# Patient Record
Sex: FEMALE | Race: BLACK OR AFRICAN AMERICAN | NOT HISPANIC OR LATINO | Employment: FULL TIME | URBAN - METROPOLITAN AREA
[De-identification: names, ages, dates, MRNs, and addresses within clinical notes are randomized per-mention and may not be internally consistent; named-entity substitution may affect disease eponyms.]

---

## 2019-09-13 ENCOUNTER — OFFICE VISIT (OUTPATIENT)
Dept: NEUROSURGERY | Facility: CLINIC | Age: 64
End: 2019-09-13

## 2019-09-13 VITALS
BODY MASS INDEX: 31.54 KG/M2 | DIASTOLIC BLOOD PRESSURE: 74 MMHG | WEIGHT: 178 LBS | HEART RATE: 83 BPM | SYSTOLIC BLOOD PRESSURE: 127 MMHG | HEIGHT: 63 IN

## 2019-09-13 DIAGNOSIS — M51.26 LUMBAR DISC HERNIATION: Primary | ICD-10-CM

## 2019-09-13 DIAGNOSIS — M53.2X6 SPINAL INSTABILITY, LUMBAR: ICD-10-CM

## 2019-09-13 DIAGNOSIS — M43.16 SPONDYLOLISTHESIS, LUMBAR REGION: ICD-10-CM

## 2019-09-13 PROCEDURE — 99204 OFFICE O/P NEW MOD 45 MIN: CPT | Performed by: NEUROLOGICAL SURGERY

## 2019-09-13 RX ORDER — GABAPENTIN 100 MG/1
CAPSULE ORAL
COMMUNITY
Start: 2019-08-19

## 2019-09-13 RX ORDER — MELOXICAM 15 MG/1
15 TABLET ORAL DAILY
COMMUNITY
Start: 2019-06-03

## 2019-09-13 RX ORDER — LISINOPRIL 10 MG/1
TABLET ORAL
COMMUNITY
Start: 2019-07-09

## 2019-09-13 RX ORDER — FLUTICASONE PROPIONATE 50 MCG
SPRAY, SUSPENSION (ML) NASAL
COMMUNITY
Start: 2019-08-18

## 2019-09-13 RX ORDER — MELATONIN
1000 DAILY
COMMUNITY

## 2019-09-13 RX ORDER — PYRAZINAMIDE 500 MG/1
TABLET ORAL
COMMUNITY
Start: 2019-07-23

## 2019-09-13 RX ORDER — CETIRIZINE HYDROCHLORIDE 10 MG/1
10 TABLET ORAL DAILY
COMMUNITY

## 2019-09-13 NOTE — PROGRESS NOTES
Subjective   History of Present Illness: Alyssa Waller is a 64 y.o. female is here today as a self referral for back pain that radiates into bilateral lower extremity's with numbness and tingling in her left foot.  She was worked up and treated by Dr. Serrato, ortho spine, at [facility] this spring and summer.  She presents today with a former patient of mine who she works with at [employer].      She has had this pain since May 2019 which began as back and left leg pain.  She has done 6-7 weeks of physical therapy and 2 HIMA with no relief. She is currently in pain management at [facility]. She is currently taking Gabapentin 100 mg 3 times a day.  She denies loss of motor or sensory function in lower extremities.  Denies bowel or bladder dysfunction.  She became tearful during the discussion because she feels like she has been dealing with it so long without relief.    Back Pain   This is a recurrent problem. The current episode started more than 1 month ago. The problem occurs constantly. The problem has been gradually worsening since onset. The pain is present in the lumbar spine. The quality of the pain is described as aching, burning and stabbing. The pain radiates to the left thigh and left knee. The pain is at a severity of 10/10. The pain is severe. The pain is worse during the day. The symptoms are aggravated by standing and sitting (Ambulating). Associated symptoms include numbness and tingling. Pertinent negatives include no bladder incontinence, bowel incontinence or chest pain. She has tried analgesics, heat, NSAIDs and home exercises (Physical therapy, HIMA,) for the symptoms. The treatment provided mild relief.       The following portions of the patient's history were reviewed and updated as appropriate: allergies, current medications, past family history, past medical history, past social history, past surgical history and problem list.    Review of Systems  "  Constitutional: Positive for activity change.   HENT: Negative.    Eyes: Negative.    Respiratory: Negative.  Negative for chest tightness and shortness of breath.    Cardiovascular: Negative.  Negative for chest pain.   Gastrointestinal: Negative.  Negative for bowel incontinence.   Endocrine: Negative.    Genitourinary: Negative.  Negative for bladder incontinence.   Musculoskeletal: Positive for arthralgias ( Hip pain), back pain, gait problem and myalgias.   Skin: Negative.    Allergic/Immunologic: Negative.    Neurological: Positive for tingling and numbness.   Hematological: Negative.    Psychiatric/Behavioral: Negative.    All other systems reviewed and are negative.      Objective     Vitals:    09/13/19 1404   BP: 127/74   Pulse: 83   Weight: 80.7 kg (178 lb)   Height: 160 cm (63\")     Body mass index is 31.53 kg/m².      Physical Exam  Neurologic Exam     Physical Exam:    CONSTITUTIONAL: This 64 year old right handed -American female appears well developed, well-nourished and in no acute distress.    HEAD & FACE: the head and face are symmetric, normocephalic and atraumatic.    EYES: Inspection of the conjunctivae and lids reveals no swelling, erythema or discharge.  Pupils are round, equal and reactive to light and there is no scleral icterus.    EARS, NOSE, MOUTH & THROAT: On inspection, the ears and nose are within normal limits.    NECK: the neck is supple and symmetric. The trachea is midline with no masses.    PULMONARY: Respiratory effort is normal with no increased work of breathing or signs of respiratory distress.    CARDIOVASCULAR: Pedal pulses are +1/4 bilaterally. Examination of the extremities shows no edema or varicosities.    LYMPHATIC: There is no palpable lymphadenopathy of the neck.    MUSCULOSKELETAL: Gait and station are within normal limits. The spine has normal alignment and decreased range of motion secondary to pain, there is some discomfort to palpation of the left SI " joint.    SKIN: The skin is warm, dry and intact    NEUROLOGIC:   Cranial Nerves 2-12 intact  Normal motor strength noted. Muscle bulk and tone are normal.  Sensory exam is normal to all modalities.  Reflexes on the right side demonstrates 1/4 Triceps Reflex, 2/4 Biceps Reflex, 1/4 Brachioradialis Reflex, 0/4 Knee Jerk Reflex, 0/4 Ankle Jerk Reflex and no ankle clonus on the right.   Reflexes on the left side demonstrates 1/4 Triceps Reflex, 2/4 Biceps Reflex, 1/4 Brachioradialis Reflex, 0/4 Knee Jerk Reflex, 0/4 Ankle Jerk Reflex and no ankle clonus on the left.  Superficial/Primitive Reflexes: primitive reflexes were absent.  No Amaya's, Babinski or clonus  No coordination deficit observed.  Radicular testing showed a negative Ras (JORGE) test and positive straight leg raise on the left.  Cortical function is intact and without deficits. Speech is normal.    PSYCHIATRIC: oriented to person, place and time. Patient's mood and affect are normal.    Assessment/Plan   Independent Review of Radiographic Studies:     Flexion-extension lumbar xray views were done at [facility] (Films not available) reported May 31, 2019 and the report we have only states there is 7.6 mm of spondylolisthesis (technically grade 2 if I extrapolate to the MRI) at L4-5 and the flexion-extension is not commented upon.  The report is appears to be incomplete.     I personally reviewed the images from the following studies.    MRI of the cervical spine done at [facility] on 2/26/2019 reveals some fairly mild degenerative change throughout the cervical spine all areas of stenosis or neuroforaminal narrowing are mild with the exception of right C6-C7 is moderate only.    MRI of the lumbar spine done at [facility] on 6/13/2019 reveals left-sided disc herniation L5-S1 extending into the left neuroforamen.      There is equally significant grade 1 spondylolisthesis at L4-5 with facet hypertrophy causing grade 1  spondylolisthesis measuring 4 mm.  Moderate lumbar spinal stenosis at L3-4.    If the plain films reported above and the MRI I am seen are accurate it would suggest that between standing and lying flat in the MRI that there is motion from 4 mm on the MRI to nearly 8 mm (technically grade 2 spondylolisthesis) suggesting gross instability at L4-5.    Medical Decision Making:      Since she has gross instability and two-level disease as a general neurosurgeon for multilevel reconstructions I recommend orthospine evaluations.  I do not have a subspecialty training in spine and I would defer to the spinal subspecialist for management.  Would appear that she is failed conservative treatment and needs to consider surgery at this point.  As a second opinion I suggest she return to the orthospine specialist, Dr. Serrato, for further management.    Alyssa was seen today for back pain.    Diagnoses and all orders for this visit:    Lumbar disc herniation  -     Ambulatory Referral to Orthopedic Surgery    Spondylolisthesis, lumbar region  -     Ambulatory Referral to Orthopedic Surgery    Spinal instability, lumbar  -     Ambulatory Referral to Orthopedic Surgery      No Follow-up on file.       Reji Wiley MD FACS FAANS  Neurological Surgery